# Patient Record
Sex: MALE | Race: WHITE | Employment: FULL TIME | URBAN - METROPOLITAN AREA
[De-identification: names, ages, dates, MRNs, and addresses within clinical notes are randomized per-mention and may not be internally consistent; named-entity substitution may affect disease eponyms.]

---

## 2022-04-22 ENCOUNTER — HOSPITAL ENCOUNTER (OUTPATIENT)
Age: 47
Discharge: HOME | End: 2022-04-22
Payer: COMMERCIAL

## 2022-04-22 ENCOUNTER — APPOINTMENT (OUTPATIENT)
Dept: GENERAL RADIOLOGY | Age: 47
End: 2022-04-22
Attending: NURSE PRACTITIONER
Payer: COMMERCIAL

## 2022-04-22 VITALS
DIASTOLIC BLOOD PRESSURE: 70 MMHG | RESPIRATION RATE: 18 BRPM | OXYGEN SATURATION: 98 % | SYSTOLIC BLOOD PRESSURE: 111 MMHG | HEART RATE: 85 BPM | TEMPERATURE: 98 F

## 2022-04-22 DIAGNOSIS — M25.571 ACUTE RIGHT ANKLE PAIN: Primary | ICD-10-CM

## 2022-04-22 DIAGNOSIS — M76.60 ACHILLES TENDON PAIN: ICD-10-CM

## 2022-04-22 PROCEDURE — 73610 X-RAY EXAM OF ANKLE: CPT | Performed by: NURSE PRACTITIONER

## 2022-04-22 PROCEDURE — 29515 APPLICATION SHORT LEG SPLINT: CPT | Performed by: NURSE PRACTITIONER

## 2022-04-22 PROCEDURE — 99203 OFFICE O/P NEW LOW 30 MIN: CPT | Performed by: NURSE PRACTITIONER

## 2022-04-22 RX ORDER — PREDNISONE 20 MG/1
40 TABLET ORAL DAILY
Qty: 10 TABLET | Refills: 0 | Status: SHIPPED | OUTPATIENT
Start: 2022-04-22 | End: 2022-04-27

## 2022-04-22 NOTE — ED INITIAL ASSESSMENT (HPI)
Pt presents to the IC with c/o right ankle and achilles pain, atraumatic for the last 2 days. Better yesterday, worse today. No numbness/tingling.

## 2022-05-12 ENCOUNTER — OFFICE VISIT (OUTPATIENT)
Dept: ORTHOPEDICS CLINIC | Facility: CLINIC | Age: 47
End: 2022-05-12
Payer: COMMERCIAL

## 2022-05-12 DIAGNOSIS — M76.61 ACHILLES TENDINITIS OF RIGHT LOWER EXTREMITY: Primary | ICD-10-CM

## 2022-05-12 PROCEDURE — L4361 PNEUMA/VAC WALK BOOT PRE OTS: HCPCS | Performed by: ORTHOPAEDIC SURGERY

## 2022-05-12 PROCEDURE — 99204 OFFICE O/P NEW MOD 45 MIN: CPT | Performed by: ORTHOPAEDIC SURGERY

## 2022-06-03 ENCOUNTER — OFFICE VISIT (OUTPATIENT)
Dept: ORTHOPEDICS CLINIC | Facility: CLINIC | Age: 47
End: 2022-06-03
Payer: COMMERCIAL

## 2022-06-03 DIAGNOSIS — M76.61 ACHILLES TENDINITIS OF RIGHT LOWER EXTREMITY: Primary | ICD-10-CM

## 2022-06-03 PROCEDURE — 99213 OFFICE O/P EST LOW 20 MIN: CPT | Performed by: ORTHOPAEDIC SURGERY

## 2023-12-10 ENCOUNTER — HOSPITAL ENCOUNTER (OUTPATIENT)
Age: 48
Discharge: HOME | End: 2023-12-10
Payer: COMMERCIAL

## 2023-12-10 VITALS
DIASTOLIC BLOOD PRESSURE: 95 MMHG | SYSTOLIC BLOOD PRESSURE: 133 MMHG | HEART RATE: 100 BPM | RESPIRATION RATE: 16 BRPM | TEMPERATURE: 97 F | OXYGEN SATURATION: 96 %

## 2023-12-10 DIAGNOSIS — R05.1 ACUTE COUGH: Primary | ICD-10-CM

## 2023-12-10 PROCEDURE — 99213 OFFICE O/P EST LOW 20 MIN: CPT | Performed by: PHYSICIAN ASSISTANT

## 2023-12-10 RX ORDER — AZITHROMYCIN 250 MG/1
TABLET, FILM COATED ORAL
Qty: 6 TABLET | Refills: 0 | Status: SHIPPED | OUTPATIENT
Start: 2023-12-10 | End: 2023-12-15

## 2023-12-10 RX ORDER — AMOXICILLIN 500 MG/1
1000 TABLET, FILM COATED ORAL 3 TIMES DAILY
Qty: 42 TABLET | Refills: 0 | Status: SHIPPED | OUTPATIENT
Start: 2023-12-10 | End: 2023-12-17

## 2023-12-10 NOTE — ED INITIAL ASSESSMENT (HPI)
Pt c/o sinus HA and cough since Monday   Concerned of walking pneumonia and took home covid test negative

## 2024-05-05 ENCOUNTER — HOSPITAL ENCOUNTER (OUTPATIENT)
Age: 49
Discharge: HOME | End: 2024-05-05
Payer: COMMERCIAL

## 2024-05-05 VITALS
SYSTOLIC BLOOD PRESSURE: 121 MMHG | DIASTOLIC BLOOD PRESSURE: 81 MMHG | OXYGEN SATURATION: 97 % | HEART RATE: 82 BPM | RESPIRATION RATE: 18 BRPM | TEMPERATURE: 98 F

## 2024-05-05 DIAGNOSIS — M54.9 MODERATE BACK PAIN: Primary | ICD-10-CM

## 2024-05-05 LAB
BILIRUB UR QL STRIP: NEGATIVE
CLARITY UR: CLEAR
COLOR UR: YELLOW
GLUCOSE UR STRIP-MCNC: NEGATIVE MG/DL
HGB UR QL STRIP: NEGATIVE
KETONES UR STRIP-MCNC: NEGATIVE MG/DL
LEUKOCYTE ESTERASE UR QL STRIP: NEGATIVE
NITRITE UR QL STRIP: NEGATIVE
PH UR STRIP: 5.5 [PH]
PROT UR STRIP-MCNC: NEGATIVE MG/DL
SP GR UR STRIP: >=1.03
UROBILINOGEN UR STRIP-ACNC: <2 MG/DL

## 2024-05-05 RX ORDER — CYCLOBENZAPRINE HCL 10 MG
10 TABLET ORAL 3 TIMES DAILY PRN
Qty: 20 TABLET | Refills: 0 | Status: SHIPPED | OUTPATIENT
Start: 2024-05-05 | End: 2024-05-12

## 2024-05-05 NOTE — ED INITIAL ASSESSMENT (HPI)
Pt woke up with low back pain this morning. Denies injury or trauma. Denies weakness or tingling to extremities. Using aleeve with relief. Rates pain 2/10, 6/10 this am. Denies urinary symptoms

## 2024-05-05 NOTE — ED PROVIDER NOTES
Patient Seen in: [facility]      History     Chief Complaint   Patient presents with    Back Pain     Stated Complaint: Back Pain    Subjective:   Juan A is a 48-year-old male presenting to the immediate care complaining of back pain.  Patient states that he had a small amount of back pain yesterday and then woke up this morning and had increasing pain.  Patient denies any fall, injury or trauma.  Patient states that he has not had any weakness, numbness, tingling to his legs.  Denies any urinary complaints.  No saddle anesthesia.  Patient states that the pain is mildly worse on the right than on the left.  Denies any history of kidney stones or pyelonephritis.  No fever or recent illness.  States he is otherwise feeling well.  He denies any other concerns or complaints.          Objective:   No pertinent past medical history.            No pertinent past surgical history.              No pertinent social history.            Review of Systems    Positive for stated complaint: Back Pain  Other systems are as noted in HPI.  Constitutional and vital signs reviewed.      All other systems reviewed and negative except as noted above.    Physical Exam     ED Triage Vitals [05/05/24 0809]   /81   Pulse 82   Resp 18   Temp 97.5 °F (36.4 °C)   Temp src Temporal   SpO2 97 %   O2 Device None (Room air)       Current:/81   Pulse 82   Temp 97.5 °F (36.4 °C) (Temporal)   Resp 18   SpO2 97%         Physical Exam  Vitals and nursing note reviewed.   Constitutional:       General: He is not in acute distress.     Appearance: Normal appearance. He is not ill-appearing, toxic-appearing or diaphoretic.   HENT:      Head: Normocephalic.   Cardiovascular:      Rate and Rhythm: Normal rate.   Pulmonary:      Effort: Pulmonary effort is normal.   Musculoskeletal:         General: Normal range of motion.      Cervical back: Normal and normal range of motion.      Thoracic back: Normal.      Lumbar back:  Normal.      Comments: Cervical, thoracic, lumbar and sacral spine were palpated without any tenderness, crepitus or step-off.  Patient has paraspinal pain to the low lumbar area bilaterally.  Patient has negative straight leg raise. 5/5 strength with flexion and extension of bilateral lower extremities.  No CVA tenderness.       Skin:     General: Skin is warm and dry.      Capillary Refill: Capillary refill takes less than 2 seconds.   Neurological:      General: No focal deficit present.      Mental Status: He is alert and oriented to person, place, and time.   Psychiatric:         Mood and Affect: Mood normal.         Behavior: Behavior normal.         Thought Content: Thought content normal.         Judgment: Judgment normal.              ED Course     Labs Reviewed   [facility] POCT URINALYSIS DIPSTICK            MDM             Medical Decision Making  Multiple medical diagnoses were considered including but not limited to muscular versus skeletal back pain without injury.  Patient is well appearing, non-toxic and in no acute distress.  Vital signs are stable.   History and physical exam are consistent with muscle strain.  Discussed the necessity of x-ray with the patient who agrees given no injury and no bony tenderness there is no need for an x-ray today.  No other significant findings on physical exam.  Urine dip was negative.  Recommended Motrin or Aleve for pain for the next 48 to 72 hours around-the-clock, then as needed.  Sent prescription for Flexeril for severe pain with drowsiness precautions. Recommended lidocaine patches for pain control. Recommended if patient develops any numbness, tingling, acutely worsening pain, urinary or bowel incontinence or any other concerning complaints they should go to the emergency department.  Recommended if patient has persistent pain for more than the next week they make an appointment to see the physical medicine specialist.  Otherwise recommended follow-up with primary  care doctor.  ED precautions discussed.  Patient (guardian) advised to follow up with PCP in 2-3 days.  Patient (guardian) agrees with this plan of care.  Patient (guardian) verbalizes understanding of discharge instructions and plan of care.  ED precautions discussed.  Patient (guardian) advised to follow up with PCP in 2-3 days.  Patient (guardian) agrees with this plan of care.  Patient (guardian) verbalizes understanding of discharge instructions and plan of care.      Amount and/or Complexity of Data Reviewed  Labs: ordered. Decision-making details documented in ED Course.    Risk  OTC drugs.  Prescription drug management.        Disposition and Plan     Clinical Impression:  1. Moderate back pain         Disposition:  Discharge  5/5/2024  8:33 am    Follow-up:  Alex West [address] [address] [phone]                Medications Prescribed:  Discharge Medication List as of 5/5/2024  8:34 AM        START taking these medications    Details   cyclobenzaprine 10 MG Oral Tab Take 1 tablet (10 mg total) by mouth 3 (three) times daily as needed for Muscle spasms., Normal, Disp-20 tablet, R-0

## 2024-05-05 NOTE — DISCHARGE INSTRUCTIONS
You likely have muscle strain of your back.    Take Motrin or Aleve for pain for the next 48 to 72 hours around-the-clock, then as needed.    Use the Flexeril for severe pain, remember this will make you drowsy so do not drive or drink alcohol when you take it.    Use lidocaine patches as discussed for pain control, you can buy them over-the-counter at the 4% strength.    If you develop any numbness, tingling, acutely worsening pain, urinary or bowel incontinence or any other concerning complaints you should go to the emergency department.  If you have persistent pain for more than the next week make an appointment to see the physical medicine specialist.  Otherwise follow-up with your primary care doctor.

## 2024-11-10 ENCOUNTER — HOSPITAL ENCOUNTER (OUTPATIENT)
Age: 49
Discharge: HOME | End: 2024-11-10
Payer: COMMERCIAL

## 2024-11-10 VITALS
DIASTOLIC BLOOD PRESSURE: 77 MMHG | HEART RATE: 73 BPM | OXYGEN SATURATION: 97 % | TEMPERATURE: 98 F | RESPIRATION RATE: 16 BRPM | SYSTOLIC BLOOD PRESSURE: 143 MMHG

## 2024-11-10 DIAGNOSIS — J01.90 ACUTE NON-RECURRENT SINUSITIS, UNSPECIFIED LOCATION: Primary | ICD-10-CM

## 2024-11-10 DIAGNOSIS — H61.23 BILATERAL IMPACTED CERUMEN: ICD-10-CM

## 2024-11-10 PROCEDURE — 99213 OFFICE O/P EST LOW 20 MIN: CPT

## 2024-11-10 RX ORDER — FLUTICASONE PROPIONATE 50 MCG
2 SPRAY, SUSPENSION (ML) NASAL DAILY
Qty: 16 G | Refills: 0 | Status: SHIPPED | OUTPATIENT
Start: 2024-11-10 | End: 2024-12-10

## 2024-11-10 NOTE — DISCHARGE INSTRUCTIONS
Please take the antibiotics as prescribed for sinusitis.  Please also use Flonase and Mucinex for nasal congestion. May also use over the counter decongestants.  You can also try using a [place] pot/saline rinses for congestion.  Use Tylenol or Motrin for pain or fever.  If you develop any shortness of breath, chest pain, severe headache, fever that does not resolve with medications or any other worsening or concerning symptoms you should go to the emergency department.  Otherwise follow-up with your primary care doctor.    If you get persistent earwax buildup you can use Debrox over-the-counter earwax softening drops weekly to prevent buildup.    You can also gently flush the ear with a showerhead or a bulb syringe.    Do not use Q-tips.  If you develop any ear pain, drainage, redness surrounding the ear, fever or any other concerning complaints they should go to the ED.    Otherwise follow up with your primary care provider.

## 2024-11-10 NOTE — ED INITIAL ASSESSMENT (HPI)
Pt with shooting/throbbing L ear and L side of jaw pain for \"a little over a week\". Pt rated pain 1/10. Pt denied fevers.

## 2024-11-10 NOTE — ED PROVIDER NOTES
Patient Seen in: [facility]      History     Chief Complaint   Patient presents with    Ear Pain     Stated Complaint: Sinus Pain  Subjective:   Juan A is a 49-year-old male presenting to the immediate care complaining of sinus pressure, left ear pain, ear congestion that radiates down to the upper left jaw for the past week and a half.  Patient states that he had similar symptoms about a year and a half ago and was treated for sinusitis and felt much better.  Patient states that he has not had a fever, chest pain, shortness of breath, dizziness, weakness, abdominal pain or vomiting.  He is eating and drinking well and is well-hydrated.  He denies any other concerns or complaints.        Objective:   Past Medical History:    Diabetes (HCC)            Past Surgical History:   Procedure Laterality Date    Repair ing hernia,3+y/o,reducibl                Social History     Socioeconomic History    Marital status:    Tobacco Use    Smoking status: Never    Smokeless tobacco: Never   Vaping Use    Vaping status: Never Used   Substance and Sexual Activity    Alcohol use: Yes    Drug use: Never     Social Drivers of Health     Food Insecurity: Low Risk  (10/9/2024)    Received from [facility]    Food Insecurity     Have there been times that your food ran out, and you didn't have money to get more?: No     Are there times that you worry that this might happen?: No   Transportation Needs: Low Risk  (10/9/2024)    Received from [facility]    Transportation Needs     Do you have trouble getting transportation to medical appointments?: No     How do you normally get to and from your appointments?: Other            Review of Systems    Positive for stated complaint: Ear Pain    Other systems are as noted in HPI.  Constitutional and vital signs reviewed.      All other systems reviewed and negative except as noted above.    Physical Exam     ED Triage Vitals  [11/10/24 0940]   /77   Pulse 73   Resp 16   Temp 97.6 °F (36.4 °C)   Temp src Temporal   SpO2 97 %   O2 Device None (Room air)     Current:/77   Pulse 73   Temp 97.6 °F (36.4 °C) (Temporal)   Resp 16   SpO2 97%     Physical Exam  Vitals and nursing note reviewed.   Constitutional:       General: He is not in acute distress.     Appearance: Normal appearance. He is not ill-appearing, toxic-appearing or diaphoretic.   HENT:      Head: Normocephalic.      Right Ear: Ear canal and external ear normal. No tenderness. A middle ear effusion is present. There is impacted cerumen. No mastoid tenderness.      Left Ear: Ear canal and external ear normal. No tenderness. A middle ear effusion is present. There is impacted cerumen. No mastoid tenderness.      Ears:      Comments: Bilateral cerumen impactions.  There is no tragus tenderness or mastoid tenderness to either ear.  Patient does have mild middle ear effusions bilaterally.     Nose: Congestion present.      Left Sinus: Maxillary sinus tenderness and frontal sinus tenderness present.      Mouth/Throat:      Mouth: Mucous membranes are moist.      Pharynx: Oropharynx is clear.   Eyes:      Conjunctiva/sclera: Conjunctivae normal.   Cardiovascular:      Rate and Rhythm: Normal rate and regular rhythm.      Pulses: Normal pulses.      Heart sounds: Normal heart sounds.   Pulmonary:      Effort: Pulmonary effort is normal. No respiratory distress.      Breath sounds: Normal breath sounds. No stridor. No wheezing, rhonchi or rales.   Abdominal:      General: Abdomen is flat.   Musculoskeletal:         General: Normal range of motion.      Cervical back: Normal range of motion.   Skin:     General: Skin is warm.      Capillary Refill: Capillary refill takes less than 2 seconds.   Neurological:      General: No focal deficit present.      Mental Status: He is alert and oriented to person, place, and time.   Psychiatric:         Mood and Affect: Mood normal.          Behavior: Behavior normal.         Thought Content: Thought content normal.         Judgment: Judgment normal.         ED Course   Radiology:  No results found.  Labs Reviewed - No data to display    MDM     Medical Decision Making  Differential diagnoses reflecting the complexity of care include but are not limited to viral versus bacterial etiology of upper respiratory complaints, mastoiditis, cerumen impaction, less likely cardiac or neurologic etiology  Comorbidities that add complexity to management include: None  History obtained by an independent source was from: Patient  Patient is well appearing, non-toxic and in no acute distress.  Vital signs are stable.     There is minimal utility to COVID or influenza testing given the duration of illness as it will not .  History and physical exam are consistent with sinusitis.  States that he has sinus pressure, left ear pain that radiates down to his jaw at times.  No tooth pain or obvious dental infection.  No sore throat.  No trismus.  Patient has not had any chest pain or neurologic complaints.  No gross neurologic deficit on exam.  Sent a prescription for Augmentin to treat sinusitis.  Recommended that patient drink plenty of fluids, use Tylenol and Motrin for pain or fever, use Flonase and Mucinex for nasal congestion and may use over-the-counter medications for congestion as needed.  Also recommended using saline rinses/[place] pot for nasal congestion.  Recommended that if the patient develops any chest pain, respiratory complaints, severe headache, fever that does not improve with medications or any other concerning complaints they should go to the emergency department.      History and physical exam are also consistent with bilateral cerumen impaction.  Cerumen impaction irrigated with good results.  Bilateral TMs with mild middle ear effusions, canals clear and without redness, swelling or irritation following irrigation. Recommended  that if the patient develop any ear pain, drainage, redness surrounding the ear, fever or any other concerning complaints they should go to the ED.  Recommended the patient make an appointment to follow up with your primary care provider.      ED precautions discussed.  Patient (guardian) advised to follow up with PCP in 2-3 days.  Patient (guardian) agrees with this plan of care.  Patient (guardian) verbalizes understanding of discharge instructions and plan of care.      Risk  OTC drugs.  Prescription drug management.        Disposition and Plan     Clinical Impression:  1. Acute non-recurrent sinusitis, unspecified location    2. Bilateral impacted cerumen         Disposition:  Discharge  11/10/2024 10:17 am    Follow-up:  Alex West [address] [address] [phone]                Medications Prescribed:  Discharge Medication List as of 11/10/2024 10:18 AM        START taking these medications    Details   amoxicillin clavulanate 875-125 MG Oral Tab Take 1 tablet by mouth 2 (two) times daily for 10 days., Normal, Disp-20 tablet, R-0      fluticasone propionate 50 MCG/ACT Nasal Suspension 2 sprays by Nasal route daily., Normal, Disp-16 g, R-0

## (undated) NOTE — LETTER
Date & Time: 12/10/2023, 9:30 AM  Patient: Mariah Oliver  Encounter Provider(s):    Amy Olson PA-C       To Whom It May Concern:    Mariah Oliver was seen and treated in our department on 12/10/2023. He can return to work 12/11/2023.     If you have any questions or concerns, please do not hesitate to call.        _____________________________  Physician/APC Signature